# Patient Record
Sex: MALE | URBAN - METROPOLITAN AREA
[De-identification: names, ages, dates, MRNs, and addresses within clinical notes are randomized per-mention and may not be internally consistent; named-entity substitution may affect disease eponyms.]

---

## 2023-05-30 ENCOUNTER — OFFICE (OUTPATIENT)
Dept: URBAN - METROPOLITAN AREA CLINIC 76 | Facility: CLINIC | Age: 68
Setting detail: OPHTHALMOLOGY
End: 2023-05-30
Payer: COMMERCIAL

## 2023-05-30 DIAGNOSIS — H25.092: ICD-10-CM

## 2023-05-30 DIAGNOSIS — H52.4: ICD-10-CM

## 2023-05-30 DIAGNOSIS — H43.393: ICD-10-CM

## 2023-05-30 DIAGNOSIS — H25.091: ICD-10-CM

## 2023-05-30 DIAGNOSIS — I63.50: ICD-10-CM

## 2023-05-30 DIAGNOSIS — H40.033: ICD-10-CM

## 2023-05-30 DIAGNOSIS — E11.3392: ICD-10-CM

## 2023-05-30 DIAGNOSIS — E11.3391: ICD-10-CM

## 2023-05-30 PROCEDURE — 92250 FUNDUS PHOTOGRAPHY W/I&R: CPT | Performed by: OPHTHALMOLOGY

## 2023-05-30 PROCEDURE — 92004 COMPRE OPH EXAM NEW PT 1/>: CPT | Performed by: OPHTHALMOLOGY

## 2023-05-30 PROCEDURE — 92015 DETERMINE REFRACTIVE STATE: CPT | Performed by: OPHTHALMOLOGY

## 2023-05-30 ASSESSMENT — KERATOMETRY
OS_K1POWER_DIOPTERS: 44.25
OD_K1POWER_DIOPTERS: 44.75
OD_K2POWER_DIOPTERS: 45.75
OS_K2POWER_DIOPTERS: 44.50
OS_AXISANGLE_DEGREES: 101
OD_AXISANGLE_DEGREES: 122

## 2023-05-30 ASSESSMENT — REFRACTION_MANIFEST
OD_CYLINDER: -1.00
OS_SPHERE: +1.50
OS_CYLINDER: -1.50
OS_AXIS: 70
OD_AXIS: 70
OD_VA1: 20/25
OS_ADD: +2.50
OS_VA2: 20/25
OD_VA2: 20/25
OS_VA1: 20/30-1
OD_ADD: +2.50
OU_VA: 20/20
OD_SPHERE: +1.50

## 2023-05-30 ASSESSMENT — SPHEQUIV_DERIVED
OS_SPHEQUIV: 0.75
OS_SPHEQUIV: 1.75
OD_SPHEQUIV: 1
OD_SPHEQUIV: 0.75

## 2023-05-30 ASSESSMENT — VISUAL ACUITY
OD_BCVA: 20/70-1
OS_BCVA: 20/50

## 2023-05-30 ASSESSMENT — AXIALLENGTH_DERIVED
OD_AL: 22.6028
OS_AL: 22.6296
OD_AL: 22.6926
OS_AL: 22.9939

## 2023-05-30 ASSESSMENT — REFRACTION_AUTOREFRACTION
OS_CYLINDER: -1.50
OD_AXIS: 072
OD_SPHERE: +1.50
OD_CYLINDER: -1.50
OS_AXIS: 067
OS_SPHERE: +2.50

## 2023-05-30 ASSESSMENT — TONOMETRY
OD_IOP_MMHG: 14
OS_IOP_MMHG: 14

## 2023-05-30 ASSESSMENT — CONFRONTATIONAL VISUAL FIELD TEST (CVF)
OS_FINDINGS: FULL
OD_FINDINGS: FULL

## 2024-03-14 PROBLEM — Z00.00 ENCOUNTER FOR PREVENTIVE HEALTH EXAMINATION: Status: ACTIVE | Noted: 2024-03-14

## 2024-03-15 RX ORDER — ESCITALOPRAM OXALATE 5 MG/1
5 TABLET ORAL
Refills: 0 | Status: ACTIVE | COMMUNITY

## 2024-03-15 RX ORDER — CHROMIUM 200 MCG
1000 TABLET ORAL
Refills: 0 | Status: ACTIVE | COMMUNITY

## 2024-03-15 RX ORDER — SENNOSIDES 8.6 MG TABLETS 8.6 MG/1
8.6 TABLET ORAL
Refills: 0 | Status: ACTIVE | COMMUNITY

## 2024-03-15 RX ORDER — ALBUTEROL SULFATE 2.5 MG/3ML
(2.5 MG/3ML) SOLUTION RESPIRATORY (INHALATION)
Refills: 0 | Status: ACTIVE | COMMUNITY

## 2024-03-15 RX ORDER — LACOSAMIDE 150 MG/1
150 TABLET ORAL TWICE DAILY
Refills: 0 | Status: ACTIVE | COMMUNITY

## 2024-03-15 RX ORDER — LEVETIRACETAM 750 MG/1
750 TABLET, FILM COATED ORAL TWICE DAILY
Refills: 0 | Status: ACTIVE | COMMUNITY

## 2024-03-15 RX ORDER — LINAGLIPTIN AND METFORMIN HYDROCHLORIDE 2.5; 5 MG/1; MG/1
2.5-5 TABLET, FILM COATED ORAL
Refills: 0 | Status: ACTIVE | COMMUNITY

## 2024-03-18 ENCOUNTER — APPOINTMENT (OUTPATIENT)
Dept: CARDIOLOGY | Facility: CLINIC | Age: 69
End: 2024-03-18
Payer: MEDICARE

## 2024-03-18 ENCOUNTER — NON-APPOINTMENT (OUTPATIENT)
Age: 69
End: 2024-03-18

## 2024-03-18 VITALS
WEIGHT: 141 LBS | TEMPERATURE: 98.1 F | DIASTOLIC BLOOD PRESSURE: 78 MMHG | SYSTOLIC BLOOD PRESSURE: 139 MMHG | HEART RATE: 77 BPM | OXYGEN SATURATION: 95 %

## 2024-03-18 PROCEDURE — 93000 ELECTROCARDIOGRAM COMPLETE: CPT

## 2024-03-18 PROCEDURE — G2211 COMPLEX E/M VISIT ADD ON: CPT

## 2024-03-18 PROCEDURE — 99204 OFFICE O/P NEW MOD 45 MIN: CPT

## 2024-03-18 NOTE — PHYSICAL EXAM
[Well Nourished] : well nourished [Well Developed] : well developed [Normal Conjunctiva] : normal conjunctiva [No Acute Distress] : no acute distress [Normal Venous Pressure] : normal venous pressure [No Carotid Bruit] : no carotid bruit [No Murmur] : no murmur [Normal S1, S2] : normal S1, S2 [No Gallop] : no gallop [No Rub] : no rub [Clear Lung Fields] : clear lung fields [Good Air Entry] : good air entry [No Respiratory Distress] : no respiratory distress  [Non Tender] : non-tender [Soft] : abdomen soft [No Masses/organomegaly] : no masses/organomegaly [Normal Bowel Sounds] : normal bowel sounds [No Edema] : no edema [Normal Gait] : normal gait [No Cyanosis] : no cyanosis [No Varicosities] : no varicosities [No Clubbing] : no clubbing [No Rash] : no rash [No Skin Lesions] : no skin lesions [Normal memory] : normal memory [Alert and Oriented] : alert and oriented

## 2024-03-18 NOTE — ASSESSMENT
[FreeTextEntry1] : 1. CAD:  - unclear history, no records are available.  - CTCA for assessment on CAD for atypical chest pain   2. CHRISTENSEN with minimal exertion  - TTE for evaluation of cardiac structure and function.   3. Stroke - no history of afib  - continue aspirin, statin at current doses.  - consider ILR for long term monitoring at next visit   4. HTN: stable.  - continue with current regimen.   RTC in 1 month

## 2024-03-18 NOTE — REASON FOR VISIT
[FreeTextEntry1] : PCP: Elana, 68M with  LHC in [facility] with severe triple vessel disease, but went to [place] were non-obstructive disease, Stroke x3 (12 years ago)  Echocardiogram  Unclear history  No records available.  CHRISTENSEN  atypical chest pain.

## 2024-03-19 LAB
CHOLEST SERPL-MCNC: 119 MG/DL
ESTIMATED AVERAGE GLUCOSE: 146 MG/DL
HBA1C MFR BLD HPLC: 6.7 %
HDLC SERPL-MCNC: 28 MG/DL
LDLC SERPL CALC-MCNC: 65 MG/DL
NONHDLC SERPL-MCNC: 91 MG/DL
TRIGL SERPL-MCNC: 146 MG/DL

## 2024-04-17 NOTE — REASON FOR VISIT
[FreeTextEntry1] : PCP: Elana, 68M with  LHC in [facility] with severe triple vessel disease, but went to [place] were non-obstructive disease, Stroke x3 (12 years ago)  Echocardiogram  Unclear history  No records available.  CHRISTENSEN  atypical chest pain.

## 2024-04-17 NOTE — REASON FOR VISIT
[FreeTextEntry1] : PCP: Elana, 68M with  LHC in [facility] with severe triple vessel disease, but went to [place] were non-obstructive disease, Stroke x3 (12 years ago)  Echocardiogram  Unclear history  No records available.  CHRISTENSEN  atypical chest pain.

## 2024-04-17 NOTE — PHYSICAL EXAM
[Well Developed] : well developed [Well Nourished] : well nourished [No Acute Distress] : no acute distress [Normal Conjunctiva] : normal conjunctiva [Normal Venous Pressure] : normal venous pressure [No Carotid Bruit] : no carotid bruit [Normal S1, S2] : normal S1, S2 [No Murmur] : no murmur [No Rub] : no rub [No Gallop] : no gallop [Clear Lung Fields] : clear lung fields [Good Air Entry] : good air entry [No Respiratory Distress] : no respiratory distress  [Soft] : abdomen soft [Non Tender] : non-tender [No Masses/organomegaly] : no masses/organomegaly [Normal Bowel Sounds] : normal bowel sounds [Normal Gait] : normal gait [No Edema] : no edema [No Cyanosis] : no cyanosis [No Clubbing] : no clubbing [No Varicosities] : no varicosities [No Rash] : no rash [No Skin Lesions] : no skin lesions [Alert and Oriented] : alert and oriented [Normal memory] : normal memory

## 2024-04-22 ENCOUNTER — APPOINTMENT (OUTPATIENT)
Dept: CARDIOLOGY | Facility: CLINIC | Age: 69
End: 2024-04-22

## 2024-04-23 ENCOUNTER — APPOINTMENT (OUTPATIENT)
Dept: CARDIOLOGY | Facility: CLINIC | Age: 69
End: 2024-04-23
Payer: MEDICARE

## 2024-04-23 PROCEDURE — 93306 TTE W/DOPPLER COMPLETE: CPT

## 2024-05-08 ENCOUNTER — APPOINTMENT (OUTPATIENT)
Dept: CT IMAGING | Facility: CLINIC | Age: 69
End: 2024-05-08
Payer: MEDICARE

## 2024-05-08 PROCEDURE — 75574 CT ANGIO HRT W/3D IMAGE: CPT

## 2024-05-16 ENCOUNTER — NON-APPOINTMENT (OUTPATIENT)
Age: 69
End: 2024-05-16

## 2024-05-16 ENCOUNTER — APPOINTMENT (OUTPATIENT)
Dept: CARDIOLOGY | Facility: CLINIC | Age: 69
End: 2024-05-16
Payer: MEDICARE

## 2024-05-16 VITALS
DIASTOLIC BLOOD PRESSURE: 79 MMHG | OXYGEN SATURATION: 96 % | TEMPERATURE: 98.5 F | HEART RATE: 64 BPM | SYSTOLIC BLOOD PRESSURE: 143 MMHG

## 2024-05-16 DIAGNOSIS — I50.9 HEART FAILURE, UNSPECIFIED: ICD-10-CM

## 2024-05-16 DIAGNOSIS — I25.118 ATHEROSCLEROTIC HEART DISEASE OF NATIVE CORONARY ARTERY WITH OTHER FORMS OF ANGINA PECTORIS: ICD-10-CM

## 2024-05-16 PROCEDURE — 99215 OFFICE O/P EST HI 40 MIN: CPT

## 2024-05-16 PROCEDURE — G2211 COMPLEX E/M VISIT ADD ON: CPT

## 2024-05-16 PROCEDURE — 93000 ELECTROCARDIOGRAM COMPLETE: CPT

## 2024-05-16 RX ORDER — METOPROLOL TARTRATE 25 MG/1
25 TABLET, FILM COATED ORAL
Qty: 180 | Refills: 1 | Status: ACTIVE | COMMUNITY
Start: 2024-05-08 | End: 1900-01-01

## 2024-05-16 RX ORDER — AMLODIPINE BESYLATE 5 MG/1
5 TABLET ORAL DAILY
Qty: 90 | Refills: 0 | Status: ACTIVE | COMMUNITY
Start: 1900-01-01 | End: 1900-01-01

## 2024-05-16 RX ORDER — EZETIMIBE 10 MG/1
10 TABLET ORAL
Qty: 90 | Refills: 0 | Status: ACTIVE | COMMUNITY
Start: 2024-05-16 | End: 1900-01-01

## 2024-05-16 RX ORDER — LOSARTAN POTASSIUM 100 MG/1
100 TABLET, FILM COATED ORAL DAILY
Qty: 90 | Refills: 3 | Status: ACTIVE | COMMUNITY
Start: 1900-01-01 | End: 1900-01-01

## 2024-05-16 RX ORDER — ATORVASTATIN CALCIUM 80 MG/1
80 TABLET, FILM COATED ORAL DAILY
Qty: 30 | Refills: 5 | Status: ACTIVE | COMMUNITY
Start: 1900-01-01 | End: 1900-01-01

## 2024-05-16 NOTE — REASON FOR VISIT
[FreeTextEntry1] : 68M with PMHx of CAD (previously reported mild LM, LAD, RCA disease in [place]), CVA x3, DM2 (well controlled) initially established care with me 4/2024. Patient did not have any records from [facility]. Patient has deficits from his prior strokes and is mostly sedentary. Denies any dyspnea, chest pain at rest but endorses chest tightness and dyspnea with exertion.

## 2024-05-16 NOTE — CARDIOLOGY SUMMARY
[de-identified] : 5/16/2024:  NSR with TWI noted in AVL (no prior ECG available for comparison) [de-identified] : 4/23/2024 1. Left ventricular cavity is small. Left ventricular wall thickness is mildly increased. Left ventricular systolic function is hyperdynamic with an ejection fraction visually estimated at >75 %. There are no regional wall motion abnormalities seen. 2. The left ventricular diastolic function is indeterminate. 3. Normal right ventricular cavity size, with normal wall thickness, and normal systolic function. Tricuspid annular plane systolic excursion (TAPSE) is 2.2 cm (normal >=1.7 cm). Tricuspid annular tissue Doppler S' is 11.0 cm/s (normal >10 cm/s). 4. No echocardiographic evidence of pulmonary hypertension. 5. No pericardial effusion seen. 6. Mild tricuspid regurgitation. 7. Mild to moderate aortic regurgitation. 8. Technically difficult image quality. 9. There is mild calcification of the mitral valve annulus. 10. Tricuspid aortic valve with normal leaflet excursion with reduced systolic excursion. There is calcification of the aortic valve leaflets. Mild aortic stenosis. [de-identified] : 5/8/2024 Agatston calcium score: 113. Coronary CTA: Right coronary artery dominance. Multifocal plaque with significant stenoses of distal left main artery (50%), mid LAD (severe), first diagonal artery (severe), medium caliber ramus intermedius (severe), obtuse marginal artery (at least moderate), proximal LAD (borderline mild to moderate), and proximal RCA (borderline mild to moderate). Additional multifocal plaque resulting in minimal to mild category disease as above.  Sub-4 mm pulmonary nodules can be followed with dedicated chest CT in 12 months, or as per patient risk factors.  Stenosis Reference Ranges: Minimal <25% Mild 25-49% Moderate 50-69% Severe 70-99% Occluded >99%

## 2024-05-16 NOTE — DISCUSSION/SUMMARY
[EKG obtained to assist in diagnosis and management of assessed problem(s)] : EKG obtained to assist in diagnosis and management of assessed problem(s) [FreeTextEntry1] : 1. CAD: no angina at rest.  - Appears to have progression of disease of CTCA since his LHC in [place] in 2018, which point was reported to have mild disease in LM, LAD and RCA. Now patient demonstrates significant progression of disease with 50% distal left main, and severe mid-LAD lesion. Recommendation is diagnostic [facility] for further evaluation. Patient was referred to interventional cardiologist and [facility] but patient and family wants to pursue second opinion at [facility] and the C was cancelled at their request.  - continue aspirin, high-intensity statin and metoprolol at current dose.  - given very high risk of cardiovascular events, will add Zetia 10 mg daily to get LDL <55.  - CTCA shows 0.4 mm lung nodule, which should be followed up with dedicated CT chest in 12 month. This was communicated to daughter over phone on 5/16, and records sent to PCP   2. Dyspnea: Likely related to obstructive CAD.  - TTE for evaluation of cardiac structure and function showed normal EF with mild-moderate valvular disease (mild AS, mild-moderate AI).     3. Stroke:  - no history of atrial fibrillation - continue aspirin, statin at current doses.  - consider ILR for long term monitoring at next visit  - will likely need carotid evaluation   4. HTN: stable.  - continue with current regimen.   5. Aortic Valve disease:  - mild AS, mild-moderate AI - repeat echocardiogram in 2-3 years or earlier if symptomatic  RTC in 1 month after second opinion.   Billing Statement: ECG obtained in the diagnosis and treatment of assessed problems. Total time spent on this encounter was 60 minutes. This includes non-face-to-face reviewing relevant portions of the patient's medical records prior to visit as well as time spent in completing documentation and coordination of care. During face-to-face time, I obtained medical history, examined the patient and discussed cardiovascular assessment and plan.

## 2024-05-20 LAB
APO B SERPL-MCNC: 69 MG/DL
CRP SERPL HS-MCNC: 0.71 MG/L
ESTIMATED AVERAGE GLUCOSE: 154 MG/DL
HBA1C MFR BLD HPLC: 7 %

## 2024-05-22 LAB — APO LP(A) SERPL-MCNC: 75 NMOL/L

## 2024-06-17 ENCOUNTER — APPOINTMENT (OUTPATIENT)
Dept: CARDIOLOGY | Facility: CLINIC | Age: 69
End: 2024-06-17

## 2024-10-28 ENCOUNTER — RX RENEWAL (OUTPATIENT)
Age: 69
End: 2024-10-28

## 2025-03-14 ENCOUNTER — RX RENEWAL (OUTPATIENT)
Age: 70
End: 2025-03-14

## 2025-04-14 ENCOUNTER — RX RENEWAL (OUTPATIENT)
Age: 70
End: 2025-04-14

## 2025-04-17 ENCOUNTER — RX RENEWAL (OUTPATIENT)
Age: 70
End: 2025-04-17

## 2025-05-07 ENCOUNTER — RX RENEWAL (OUTPATIENT)
Age: 70
End: 2025-05-07

## 2025-05-12 ENCOUNTER — RX RENEWAL (OUTPATIENT)
Age: 70
End: 2025-05-12